# Patient Record
Sex: MALE | Race: WHITE | Employment: UNEMPLOYED | URBAN - METROPOLITAN AREA
[De-identification: names, ages, dates, MRNs, and addresses within clinical notes are randomized per-mention and may not be internally consistent; named-entity substitution may affect disease eponyms.]

---

## 2017-11-02 ENCOUNTER — OFFICE VISIT (OUTPATIENT)
Dept: FAMILY MEDICINE CLINIC | Age: 22
End: 2017-11-02
Payer: COMMERCIAL

## 2017-11-02 VITALS
TEMPERATURE: 98.8 F | BODY MASS INDEX: 18.81 KG/M2 | OXYGEN SATURATION: 97 % | HEART RATE: 109 BPM | WEIGHT: 127 LBS | HEIGHT: 69 IN

## 2017-11-02 DIAGNOSIS — J06.9 VIRAL URI: ICD-10-CM

## 2017-11-02 DIAGNOSIS — R68.89 FLU-LIKE SYMPTOMS: Primary | ICD-10-CM

## 2017-11-02 PROCEDURE — 99213 OFFICE O/P EST LOW 20 MIN: CPT | Performed by: NURSE PRACTITIONER

## 2017-11-02 ASSESSMENT — ENCOUNTER SYMPTOMS
DIARRHEA: 0
NAUSEA: 0
COUGH: 1
SINUS PAIN: 1
SORE THROAT: 1
VOMITING: 0

## 2017-11-02 NOTE — PROGRESS NOTES
History:     Tobacco:    reports that he has been smoking. He does not have any smokeless tobacco history on file. Alcohol:      reports that he does not drink alcohol. Drug Use:  reports that he does not use drugs. Family History:     Family History   Problem Relation Age of Onset    Diabetes Paternal Grandfather     High Blood Pressure Paternal Grandfather        Review of Systems:         Review of Systems   Constitutional: Negative for chills and fever. HENT: Positive for ear pain, sinus pain and sore throat. Negative for ear discharge. Respiratory: Positive for cough. Cardiovascular: Negative for chest pain and palpitations. Gastrointestinal: Negative for diarrhea, nausea and vomiting. Neurological: Positive for headaches. Physical Exam:     Vitals:  Pulse 109   Temp 98.8 °F (37.1 °C) (Oral)   Ht 5' 9\" (1.753 m)   Wt 127 lb (57.6 kg)   SpO2 97%   BMI 18.75 kg/m²       Physical Exam   Constitutional: He appears well-developed and well-nourished. He is cooperative. HENT:   Right Ear: Tympanic membrane normal.   Left Ear: Tympanic membrane normal.   Nose: Mucosal edema present. Mouth/Throat: Posterior oropharyngeal erythema present. Cardiovascular: Normal rate, regular rhythm and normal heart sounds. Pulmonary/Chest: Effort normal and breath sounds normal. No respiratory distress. Neurological: He is alert. Nursing note and vitals reviewed. Data:     No results found for: NA, K, CL, CO2, BUN, CREATININE, GLUCOSE, PROT, LABALBU, BILITOT, ALKPHOS, AST, ALT  No results found for: WBC, RBC, HGB, HCT, MCV, MCH, MCHC, RDW, PLT, MPV  No results found for: TSH  No results found for: CHOL, HDL, PSA, LABA1C       Assessment & Plan       1. Flu-like symptoms     2. Viral URI       Patient has been having symptoms of a viral URI. No need for antibiotics.     Increase rest and water intake  May use warm tea and honey for sore throat  May gargle salt water for sore throat  May use saline nose spray for nasal congestion      Patient verbalizes understanding and agreement with plan. All questions answered. If symptoms do not resolve or worsen, return to office. Completed Refills   Requested Prescriptions      No prescriptions requested or ordered in this encounter     Return if symptoms worsen or fail to improve. No orders of the defined types were placed in this encounter. No orders of the defined types were placed in this encounter. There are no Patient Instructions on file for this visit. Electronically signed by Nita Carl CNP on 11/2/2017 at 1:35 PM           Completed Refills   Requested Prescriptions      No prescriptions requested or ordered in this encounter         Abiodun Valerio received counseling on the following healthy behaviors: nutrition and tobacco cessation  Reviewed prior labs and health maintenance. Continue current medications, diet and exercise. Discussed use, benefit, and side effects of prescribed medications. Barriers to medication compliance addressed. Patient given educational materials - see patient instructions. All patient questions answered. Patient voiced understanding.

## 2017-11-02 NOTE — LETTER
[address]  [place] [address]  Phone: [phone]  Fax: [phone], [place]        November 2, 2017     Patient: Ok Buchanan   YOB: 1995   Date of Visit: 11/2/2017       To Whom It May Concern: It is my medical opinion that Vicki Pham should remain out of work until 11/6/2017. If you have any questions or concerns, please don't hesitate to call.     Sincerely,          Jose Quiñones, CNP